# Patient Record
Sex: FEMALE | Race: WHITE | Employment: OTHER | URBAN - METROPOLITAN AREA
[De-identification: names, ages, dates, MRNs, and addresses within clinical notes are randomized per-mention and may not be internally consistent; named-entity substitution may affect disease eponyms.]

---

## 2019-08-22 ENCOUNTER — TRANSFERRED RECORDS (OUTPATIENT)
Dept: PHYSICAL THERAPY | Facility: CLINIC | Age: 74
End: 2019-08-22

## 2019-08-29 ENCOUNTER — THERAPY VISIT (OUTPATIENT)
Dept: PHYSICAL THERAPY | Facility: CLINIC | Age: 74
End: 2019-08-29
Payer: COMMERCIAL

## 2019-08-29 DIAGNOSIS — M25.561 RIGHT KNEE PAIN: ICD-10-CM

## 2019-08-29 PROCEDURE — 97161 PT EVAL LOW COMPLEX 20 MIN: CPT | Mod: GP | Performed by: PHYSICAL THERAPIST

## 2019-08-29 PROCEDURE — 97110 THERAPEUTIC EXERCISES: CPT | Mod: GP | Performed by: PHYSICAL THERAPIST

## 2019-08-29 PROCEDURE — 97140 MANUAL THERAPY 1/> REGIONS: CPT | Mod: GP | Performed by: PHYSICAL THERAPIST

## 2019-08-29 ASSESSMENT — ACTIVITIES OF DAILY LIVING (ADL)
SWELLING: I HAVE THE SYMPTOM BUT IT DOES NOT AFFECT MY ACTIVITY
RAW_SCORE: 61
RISE FROM A CHAIR: ACTIVITY IS NOT DIFFICULT
KNEE_ACTIVITY_OF_DAILY_LIVING_SCORE: 87.14
GO DOWN STAIRS: ACTIVITY IS MINIMALLY DIFFICULT
GO UP STAIRS: ACTIVITY IS MINIMALLY DIFFICULT
HOW_WOULD_YOU_RATE_THE_OVERALL_FUNCTION_OF_YOUR_KNEE_DURING_YOUR_USUAL_DAILY_ACTIVITIES?: NEARLY NORMAL
GIVING WAY, BUCKLING OR SHIFTING OF KNEE: I DO NOT HAVE THE SYMPTOM
KNEEL ON THE FRONT OF YOUR KNEE: ACTIVITY IS NOT DIFFICULT
GO DOWN STAIRS: ACTIVITY IS MINIMALLY DIFFICULT
STIFFNESS: I DO NOT HAVE THE SYMPTOM
KNEE_ACTIVITY_OF_DAILY_LIVING_SCORE: 87.14
STIFFNESS: I DO NOT HAVE THE SYMPTOM
LIMPING: I HAVE THE SYMPTOM BUT IT DOES NOT AFFECT MY ACTIVITY
SQUAT: ACTIVITY IS MINIMALLY DIFFICULT
SWELLING: I HAVE THE SYMPTOM BUT IT DOES NOT AFFECT MY ACTIVITY
WALK: ACTIVITY IS MINIMALLY DIFFICULT
STAND: ACTIVITY IS NOT DIFFICULT
WEAKNESS: I DO NOT HAVE THE SYMPTOM
STAND: ACTIVITY IS NOT DIFFICULT
HOW_WOULD_YOU_RATE_THE_OVERALL_FUNCTION_OF_YOUR_KNEE_DURING_YOUR_USUAL_DAILY_ACTIVITIES?: NEARLY NORMAL
SIT WITH YOUR KNEE BENT: ACTIVITY IS NOT DIFFICULT
KNEEL ON THE FRONT OF YOUR KNEE: ACTIVITY IS NOT DIFFICULT
LIMPING: I HAVE THE SYMPTOM BUT IT DOES NOT AFFECT MY ACTIVITY
GIVING WAY, BUCKLING OR SHIFTING OF KNEE: I DO NOT HAVE THE SYMPTOM
HOW_WOULD_YOU_RATE_THE_CURRENT_FUNCTION_OF_YOUR_KNEE_DURING_YOUR_USUAL_DAILY_ACTIVITIES_ON_A_SCALE_FROM_0_TO_100_WITH_100_BEING_YOUR_LEVEL_OF_KNEE_FUNCTION_PRIOR_TO_YOUR_INJURY_AND_0_BEING_THE_INABILITY_TO_PERFORM_ANY_OF_YOUR_USUAL_DAILY_ACTIVITIES?: 100
KNEE_ACTIVITY_OF_DAILY_LIVING_SUM: 61
GO UP STAIRS: ACTIVITY IS MINIMALLY DIFFICULT
RAW_SCORE: 61
SQUAT: ACTIVITY IS MINIMALLY DIFFICULT
RISE FROM A CHAIR: ACTIVITY IS NOT DIFFICULT
AS_A_RESULT_OF_YOUR_KNEE_INJURY,_HOW_WOULD_YOU_RATE_YOUR_CURRENT_LEVEL_OF_DAILY_ACTIVITY?: NEARLY NORMAL
WALK: ACTIVITY IS MINIMALLY DIFFICULT
PAIN: THE SYMPTOM AFFECTS MY ACTIVITY MODERATELY
WEAKNESS: I DO NOT HAVE THE SYMPTOM
KNEE_ACTIVITY_OF_DAILY_LIVING_SUM: 61
AS_A_RESULT_OF_YOUR_KNEE_INJURY,_HOW_WOULD_YOU_RATE_YOUR_CURRENT_LEVEL_OF_DAILY_ACTIVITY?: NEARLY NORMAL
SIT WITH YOUR KNEE BENT: ACTIVITY IS NOT DIFFICULT
HOW_WOULD_YOU_RATE_THE_CURRENT_FUNCTION_OF_YOUR_KNEE_DURING_YOUR_USUAL_DAILY_ACTIVITIES_ON_A_SCALE_FROM_0_TO_100_WITH_100_BEING_YOUR_LEVEL_OF_KNEE_FUNCTION_PRIOR_TO_YOUR_INJURY_AND_0_BEING_THE_INABILITY_TO_PERFORM_ANY_OF_YOUR_USUAL_DAILY_ACTIVITIES?: 100
PAIN: THE SYMPTOM AFFECTS MY ACTIVITY MODERATELY

## 2019-08-29 NOTE — PROGRESS NOTES
[facility] Initial Evaluation  Subjective:  The history is provided by the patient. No  was used.   Type of problem:  Right knee   Condition occurred with:  Insidious onset. This is a new condition   Problem details: [place]   Patient reports pain:  Anterior and medial.   Symptoms are exacerbated by activity and relieved by nothing.    Coco Merino being seen for Right Knee pain.   Problem began 7/13/2018 (about). Where condition occurred: at home.[place] reported as 1/10 on pain scale. General health as reported by patient is good. Pertinent medical history includes:  Asthma, high blood pressure, history of fractures, menopausal, numbness/tingling and overweight.      Current medications:  Anti-inflammatory and high blood pressure medication.   Primary job tasks include:  Prolonged sitting.  Pain is described as stabbing and aching and is intermittent. Pain is the same all the time. Since onset symptoms are gradually improving.      Patient is .   Barriers include:  None as reported by patient.  Red flags:  None as reported by patient.                      Objective:  System                                           Hip Evaluation    Hip Strength:      Extension:  Left: 5/5  Pain:Right: 4/5    Pain:    Abduction:  Left: 5/5     Pain:Right: 5/5    Pain:                           Knee Evaluation:  ROM:    AROM    Hyperextension:  Left:  10    Right: 3    Flexion: Left: 140    Right: 140        Strength:     Extension:  Left: 5/5   Pain:      Right: 4-/5   Pain:  Flexion:  Left: 5/5   Pain:      Right: 4/5   Pain:            Palpation:      Right knee tenderness present at:  Medial Joint Line  Edema:    Circumference:      Joint Line:  Right:  Mild-moderate edema throughout joint-line            General     ROS    Assessment/Plan:    Patient is a 74 year old female  with right side knee complaints.    Patient has the following significant findings with corresponding treatment plan.                Diagnosis 1:  Right knee pain  Pain -  manual therapy, self management and education  Decreased ROM/flexibility - manual therapy and therapeutic exercise  Decreased strength - therapeutic exercise and therapeutic activities  Impaired muscle performance - neuro re-education  Decreased function - therapeutic activities    Therapy Evaluation Codes:   1) History comprised of:   Personal factors that impact the plan of care:      None.    Comorbidity factors that impact the plan of care are:      None.     Medications impacting care: None.  2) Examination of Body Systems comprised of:   Body structures and functions that impact the plan of care:      Knee.   Activity limitations that impact the plan of care are:      Walking.  3) Clinical presentation characteristics are:   Stable/Uncomplicated.  4) Decision-Making    Low complexity using standardized patient assessment instrument and/or measureable assessment of functional outcome.  Cumulative Therapy Evaluation is: Low complexity.    Previous and current functional limitations:  (See Goal Flow Sheet for this information)    Short term and Long term goals: (See Goal Flow Sheet for this information)     Communication ability:  Patient appears to be able to clearly communicate and understand verbal and written communication and follow directions correctly.  Treatment Explanation - The following has been discussed with the patient:   RX ordered/plan of care  Anticipated outcomes  Possible risks and side effects  This patient would benefit from PT intervention to resume normal activities.   Rehab potential is good.    Frequency:  1 X week, once daily  Duration:  for 8 weeks  Discharge Plan:  Achieve all LTG.  Independent in home treatment program.  Reach maximal therapeutic benefit.    Please refer to the daily flowsheet for treatment today,  total treatment time and time spent performing 1:1 timed codes.

## 2019-08-29 NOTE — LETTER
[facility] PHYSICAL THERAPY  [address]  [place] [facility] [address]  [phone]    2019    Re: Coco Merino   :   1945  MRN:  [id number]   REFERRING PHYSICIAN:   Austin Horta    [facility] PHYSICAL THERAPY    Date of Initial Evaluation:  2019  Visits:  Rxs Used: 1  Reason for Referral:  Right knee pain    EVALUATION SUMMARY    [facility] Initial Evaluation  Subjective:  The history is provided by the patient. No  was used.   Type of problem:  Right knee   Condition occurred with:  Insidious onset. This is a new condition   Problem details: 19 [place]   Patient reports pain:  Anterior and medial.   Symptoms are exacerbated by activity and relieved by nothing.    Coco Merino being seen for Right Knee pain.   Problem began 2018 (about). Where condition occurred: at home.[place]  and reported as 1/10 on pain scale. General health as reported by patient is good. Pertinent medical history includes:  Asthma, high blood pressure, history of fractures, menopausal, numbness/tingling and overweight.      Current medications:  Anti-inflammatory and high blood pressure medication.   Primary job tasks include:  Prolonged sitting.  Pain is described as stabbing and aching and is intermittent. Pain is the same all the time. Since onset symptoms are gradually improving.      Patient is .   Barriers include:  None as reported by patient.  Red flags:  None as reported by patient.    Objective:  System    Hip Evaluation    Hip Strength:      Extension:  Left: 5/5  Pain:Right: 4/5    Pain:    Abduction:  Left: 5/5     Pain:Right: 5/5    Pain:            Re: Coco Merino   :   1945      Knee Evaluation:  ROM:    AROM    Hyperextension:  Left:  10    Right: 3    Flexion: Left: 140    Right: 140    Strength:      Extension:  Left: 5/5   Pain:      Right: 4-/5   Pain:  Flexion:  Left: 5/5   Pain:      Right: 4/5   Pain:      Palpation:      Right knee tenderness present at:  Medial Joint Line  Edema:    Circumference:    Joint Line:  Right:  Mild-moderate edema throughout joint-line    General   ROS    Assessment/Plan:    Patient is a 74 year old female with right side knee complaints.    Patient has the following significant findings with corresponding treatment plan.                Diagnosis 1:  Right knee pain  Pain -  manual therapy, self management and education  Decreased ROM/flexibility - manual therapy and therapeutic exercise  Decreased strength - therapeutic exercise and therapeutic activities  Impaired muscle performance - neuro re-education  Decreased function - therapeutic activities    Therapy Evaluation Codes:   1) History comprised of:   Personal factors that impact the plan of care:      None.    Comorbidity factors that impact the plan of care are:      None.     Medications impacting care: None.  2) Examination of Body Systems comprised of:   Body structures and functions that impact the plan of care:      Knee.   Activity limitations that impact the plan of care are:      Walking.  3) Clinical presentation characteristics are:   Stable/Uncomplicated.  4) Decision-Making    Low complexity using standardized patient assessment instrument and/or measureable assessment of functional outcome.  Cumulative Therapy Evaluation is: Low complexity.  Re: Coco Merino   :   1945    Previous and current functional limitations:  (See Goal Flow Sheet for this information)    Short term and Long term goals: (See Goal Flow Sheet for this information)     Communication ability:  Patient appears to be able to clearly communicate and understand verbal and written communication and follow directions correctly.  Treatment Explanation - The following has been discussed with the patient:   RX ordered/plan of  care  Anticipated outcomes  Possible risks and side effects  This patient would benefit from PT intervention to resume normal activities.   Rehab potential is good.    Frequency:  1 X week, once daily  Duration:  for 8 weeks  Discharge Plan:  Achieve all LTG.  Independent in home treatment program.  Reach maximal therapeutic benefit.    Please refer to the daily flowsheet for treatment today, total treatment time and time spent performing 1:1 timed codes.       Thank you for your referral.    INQUIRIES  Therapist: Lucia Thomson DPT, Cert. MDT  [facility] PHYSICAL THERAPY  [address]  [facility] [address]  Phone: [phone]  Fax: [phone]

## 2019-09-19 ENCOUNTER — THERAPY VISIT (OUTPATIENT)
Dept: PHYSICAL THERAPY | Facility: CLINIC | Age: 74
End: 2019-09-19
Payer: COMMERCIAL

## 2019-09-19 DIAGNOSIS — M25.561 RIGHT KNEE PAIN: ICD-10-CM

## 2019-09-19 PROCEDURE — 97110 THERAPEUTIC EXERCISES: CPT | Mod: GP | Performed by: PHYSICAL THERAPIST

## 2019-10-07 ENCOUNTER — THERAPY VISIT (OUTPATIENT)
Dept: PHYSICAL THERAPY | Facility: CLINIC | Age: 74
End: 2019-10-07
Payer: COMMERCIAL

## 2019-10-07 DIAGNOSIS — M25.561 RIGHT KNEE PAIN: ICD-10-CM

## 2019-10-07 PROCEDURE — 97110 THERAPEUTIC EXERCISES: CPT | Mod: GP | Performed by: PHYSICAL THERAPIST

## 2019-10-07 ASSESSMENT — ACTIVITIES OF DAILY LIVING (ADL)
KNEE_ACTIVITY_OF_DAILY_LIVING_SCORE: 98.57
KNEEL ON THE FRONT OF YOUR KNEE: ACTIVITY IS NOT DIFFICULT
KNEE_ACTIVITY_OF_DAILY_LIVING_SUM: 69
SQUAT: ACTIVITY IS NOT DIFFICULT
RAW_SCORE: 69
STIFFNESS: I DO NOT HAVE THE SYMPTOM
GO UP STAIRS: ACTIVITY IS NOT DIFFICULT
GO DOWN STAIRS: ACTIVITY IS NOT DIFFICULT
STAND: ACTIVITY IS NOT DIFFICULT
RISE FROM A CHAIR: ACTIVITY IS NOT DIFFICULT
PAIN: I DO NOT HAVE THE SYMPTOM
HOW_WOULD_YOU_RATE_THE_CURRENT_FUNCTION_OF_YOUR_KNEE_DURING_YOUR_USUAL_DAILY_ACTIVITIES_ON_A_SCALE_FROM_0_TO_100_WITH_100_BEING_YOUR_LEVEL_OF_KNEE_FUNCTION_PRIOR_TO_YOUR_INJURY_AND_0_BEING_THE_INABILITY_TO_PERFORM_ANY_OF_YOUR_USUAL_DAILY_ACTIVITIES?: 100
WEAKNESS: I DO NOT HAVE THE SYMPTOM
SIT WITH YOUR KNEE BENT: ACTIVITY IS NOT DIFFICULT
SWELLING: I DO NOT HAVE THE SYMPTOM
LIMPING: I DO NOT HAVE THE SYMPTOM
HOW_WOULD_YOU_RATE_THE_OVERALL_FUNCTION_OF_YOUR_KNEE_DURING_YOUR_USUAL_DAILY_ACTIVITIES?: NORMAL
GIVING WAY, BUCKLING OR SHIFTING OF KNEE: I DO NOT HAVE THE SYMPTOM
AS_A_RESULT_OF_YOUR_KNEE_INJURY,_HOW_WOULD_YOU_RATE_YOUR_CURRENT_LEVEL_OF_DAILY_ACTIVITY?: NORMAL
WALK: ACTIVITY IS MINIMALLY DIFFICULT

## 2019-10-07 NOTE — PROGRESS NOTES
Subjective:  HPI                    Objective:  System    Physical Exam    General     ROS    Assessment/Plan:    DISCHARGE REPORT    Progress reporting period is from 8/29/19 to 10/7/19.       SUBJECTIVE  Subjective changes noted by patient:   Patient reports the knee is feeling great.  Is ready to continue with exercises independently.  Will call with any questions or concerns.    Current pain level is 0-1/10.     Previous pain level was 5/10.   Changes in function:  Yes (See Goal flowsheet attached for changes in current functional level)  Adverse reaction to treatment or activity: None    OBJECTIVE  Changes noted in objective findings:  Yes,     AROM 8-0-140     MMT        ASSESSMENT/PLAN  Updated problem list and treatment plan: Diagnosis 1:  Right Knee    STG/LTGs have been met or progress has been made towards goals:  Yes (See Goal flow sheet completed today.)  Assessment of Progress: The patient's condition is improving.  Self Management Plans:  Patient is independent in a home treatment program.  Patient is independent in self management of symptoms.  I have re-evaluated this patient and find that the nature, scope, duration and intensity of the therapy is appropriate for the medical condition of the patient.  Coco continues to require the following intervention to meet STG and LTG's:  PT intervention is no longer required to meet STG/LTG.    Recommendations:  This patient is ready to be discharged from therapy and continue their home treatment program.    Please refer to the daily flowsheet for treatment today, total treatment time and time spent performing 1:1 timed codes.